# Patient Record
Sex: FEMALE | URBAN - METROPOLITAN AREA
[De-identification: names, ages, dates, MRNs, and addresses within clinical notes are randomized per-mention and may not be internally consistent; named-entity substitution may affect disease eponyms.]

---

## 2017-08-02 ENCOUNTER — IMPORTED ENCOUNTER (OUTPATIENT)
Dept: URBAN - METROPOLITAN AREA CLINIC 75 | Facility: CLINIC | Age: 57
End: 2017-08-02

## 2017-08-02 PROCEDURE — 67031 LASER SURGERY EYE STRANDS: CPT

## 2017-08-09 ENCOUNTER — IMPORTED ENCOUNTER (OUTPATIENT)
Dept: URBAN - METROPOLITAN AREA CLINIC 75 | Facility: CLINIC | Age: 57
End: 2017-08-09

## 2017-10-10 ENCOUNTER — IMPORTED ENCOUNTER (OUTPATIENT)
Dept: URBAN - METROPOLITAN AREA CLINIC 75 | Facility: CLINIC | Age: 57
End: 2017-10-10

## 2017-10-10 PROCEDURE — 92012 INTRM OPH EXAM EST PATIENT: CPT

## 2017-11-28 ENCOUNTER — IMPORTED ENCOUNTER (OUTPATIENT)
Dept: URBAN - METROPOLITAN AREA CLINIC 75 | Facility: CLINIC | Age: 57
End: 2017-11-28

## 2017-11-28 PROCEDURE — 92310 CONTACT LENS FITTING OU: CPT

## 2017-11-28 PROCEDURE — 92012 INTRM OPH EXAM EST PATIENT: CPT

## 2018-04-04 ENCOUNTER — HOSPITAL ENCOUNTER (OUTPATIENT)
Dept: MAMMOGRAPHY | Age: 58
Discharge: HOME | End: 2018-04-04
Attending: OBSTETRICS & GYNECOLOGY
Payer: COMMERCIAL

## 2018-04-04 DIAGNOSIS — Z12.39 BREAST SCREENING: ICD-10-CM

## 2018-04-04 PROCEDURE — 77067 SCR MAMMO BI INCL CAD: CPT

## 2018-04-10 ENCOUNTER — HOSPITAL ENCOUNTER (OUTPATIENT)
Dept: MAMMOGRAPHY | Age: 58
Discharge: HOME | End: 2018-04-10
Attending: INTERNAL MEDICINE
Payer: COMMERCIAL

## 2018-04-10 DIAGNOSIS — R92.8 ABNORMAL MAMMOGRAM: ICD-10-CM

## 2018-04-10 PROCEDURE — 76642 ULTRASOUND BREAST LIMITED: CPT

## 2018-04-10 PROCEDURE — 77065 DX MAMMO INCL CAD UNI: CPT

## 2018-05-30 ENCOUNTER — IMPORTED ENCOUNTER (OUTPATIENT)
Dept: URBAN - METROPOLITAN AREA CLINIC 75 | Facility: CLINIC | Age: 58
End: 2018-05-30

## 2018-05-30 PROCEDURE — 92014 COMPRE OPH EXAM EST PT 1/>: CPT

## 2018-05-30 PROCEDURE — 92025 CPTRIZED CORNEAL TOPOGRAPHY: CPT

## 2018-12-26 ENCOUNTER — HOSPITAL ENCOUNTER (OUTPATIENT)
Dept: MAMMOGRAPHY | Age: 58
Discharge: HOME | End: 2018-12-26
Attending: OBSTETRICS & GYNECOLOGY
Payer: COMMERCIAL

## 2018-12-26 DIAGNOSIS — R92.8 ABNORMAL MAMMOGRAM: ICD-10-CM

## 2018-12-26 PROCEDURE — 77065 DX MAMMO INCL CAD UNI: CPT

## 2019-03-27 ENCOUNTER — IMPORTED ENCOUNTER (OUTPATIENT)
Dept: URBAN - METROPOLITAN AREA CLINIC 75 | Facility: CLINIC | Age: 59
End: 2019-03-27

## 2019-03-27 PROCEDURE — 92015 DETERMINE REFRACTIVE STATE: CPT

## 2019-03-27 PROCEDURE — 92014 COMPRE OPH EXAM EST PT 1/>: CPT

## 2019-11-05 ENCOUNTER — IMPORTED ENCOUNTER (OUTPATIENT)
Dept: URBAN - METROPOLITAN AREA CLINIC 75 | Facility: CLINIC | Age: 59
End: 2019-11-05

## 2019-11-05 PROCEDURE — 92014 COMPRE OPH EXAM EST PT 1/>: CPT

## 2019-12-11 ENCOUNTER — IMPORTED ENCOUNTER (OUTPATIENT)
Dept: URBAN - METROPOLITAN AREA CLINIC 75 | Facility: CLINIC | Age: 59
End: 2019-12-11

## 2019-12-11 PROCEDURE — 92012 INTRM OPH EXAM EST PATIENT: CPT

## 2020-01-22 ENCOUNTER — IMPORTED ENCOUNTER (OUTPATIENT)
Dept: URBAN - METROPOLITAN AREA CLINIC 75 | Facility: CLINIC | Age: 60
End: 2020-01-22

## 2020-09-07 ENCOUNTER — IMPORTED ENCOUNTER (OUTPATIENT)
Dept: URBAN - METROPOLITAN AREA CLINIC 75 | Facility: CLINIC | Age: 60
End: 2020-09-07

## 2020-09-07 PROCEDURE — 92014 COMPRE OPH EXAM EST PT 1/>: CPT

## 2021-03-24 ENCOUNTER — TRANSCRIBE ORDER (OUTPATIENT)
Dept: SCHEDULING | Age: 61
End: 2021-03-24

## 2021-03-24 DIAGNOSIS — Z12.31 VISIT FOR SCREENING MAMMOGRAM: Primary | ICD-10-CM

## 2021-04-07 ENCOUNTER — IMPORTED ENCOUNTER (OUTPATIENT)
Dept: URBAN - METROPOLITAN AREA CLINIC 75 | Facility: CLINIC | Age: 61
End: 2021-04-07

## 2021-04-07 PROCEDURE — 92015 DETERMINE REFRACTIVE STATE: CPT

## 2021-04-07 PROCEDURE — 92014 COMPRE OPH EXAM EST PT 1/>: CPT

## 2021-05-16 ENCOUNTER — APPOINTMENT (OUTPATIENT)
Dept: CT IMAGING | Age: 61
End: 2021-05-16
Attending: EMERGENCY MEDICINE
Payer: COMMERCIAL

## 2021-05-16 ENCOUNTER — HOSPITAL ENCOUNTER (EMERGENCY)
Age: 61
Discharge: HOME | End: 2021-05-16
Attending: EMERGENCY MEDICINE
Payer: COMMERCIAL

## 2021-05-16 VITALS
OXYGEN SATURATION: 97 % | HEIGHT: 67 IN | HEART RATE: 82 BPM | SYSTOLIC BLOOD PRESSURE: 120 MMHG | RESPIRATION RATE: 18 BRPM | TEMPERATURE: 98.2 F | DIASTOLIC BLOOD PRESSURE: 83 MMHG

## 2021-05-16 DIAGNOSIS — F32.A DEPRESSION, UNSPECIFIED DEPRESSION TYPE: ICD-10-CM

## 2021-05-16 DIAGNOSIS — G44.209 ACUTE NON INTRACTABLE TENSION-TYPE HEADACHE: Primary | ICD-10-CM

## 2021-05-16 LAB
ALBUMIN SERPL-MCNC: 3.4 G/DL (ref 3.5–5)
ALBUMIN/GLOB SERPL: 0.9 {RATIO} (ref 1.1–2.2)
ALP SERPL-CCNC: 58 U/L (ref 45–117)
ALT SERPL-CCNC: 17 U/L (ref 12–78)
ANION GAP SERPL CALC-SCNC: 3 MMOL/L (ref 5–15)
AST SERPL-CCNC: 18 U/L (ref 15–37)
BASOPHILS # BLD: 0.1 K/UL (ref 0–0.1)
BASOPHILS NFR BLD: 1 % (ref 0–1)
BILIRUB SERPL-MCNC: 0.4 MG/DL (ref 0.2–1)
BUN SERPL-MCNC: 8 MG/DL (ref 6–20)
BUN/CREAT SERPL: 10 (ref 12–20)
CALCIUM SERPL-MCNC: 8.9 MG/DL (ref 8.5–10.1)
CHLORIDE SERPL-SCNC: 105 MMOL/L (ref 97–108)
CO2 SERPL-SCNC: 28 MMOL/L (ref 21–32)
CREAT SERPL-MCNC: 0.79 MG/DL (ref 0.55–1.02)
DIFFERENTIAL METHOD BLD: ABNORMAL
EOSINOPHIL # BLD: 0.1 K/UL (ref 0–0.4)
EOSINOPHIL NFR BLD: 1 % (ref 0–7)
ERYTHROCYTE [DISTWIDTH] IN BLOOD BY AUTOMATED COUNT: 12.8 % (ref 11.5–14.5)
GLOBULIN SER CALC-MCNC: 3.9 G/DL (ref 2–4)
GLUCOSE SERPL-MCNC: 81 MG/DL (ref 65–100)
HCT VFR BLD AUTO: 38.7 % (ref 35–47)
HGB BLD-MCNC: 12.9 G/DL (ref 11.5–16)
IMM GRANULOCYTES # BLD AUTO: 0 K/UL (ref 0–0.04)
IMM GRANULOCYTES NFR BLD AUTO: 0 % (ref 0–0.5)
LYMPHOCYTES # BLD: 2.5 K/UL (ref 0.8–3.5)
LYMPHOCYTES NFR BLD: 26 % (ref 12–49)
MCH RBC QN AUTO: 34.3 PG (ref 26–34)
MCHC RBC AUTO-ENTMCNC: 33.3 G/DL (ref 30–36.5)
MCV RBC AUTO: 102.9 FL (ref 80–99)
MONOCYTES # BLD: 0.8 K/UL (ref 0–1)
MONOCYTES NFR BLD: 8 % (ref 5–13)
NEUTS SEG # BLD: 6.1 K/UL (ref 1.8–8)
NEUTS SEG NFR BLD: 64 % (ref 32–75)
NRBC # BLD: 0 K/UL (ref 0–0.01)
NRBC BLD-RTO: 0 PER 100 WBC
PLATELET # BLD AUTO: 292 K/UL (ref 150–400)
PMV BLD AUTO: 8.9 FL (ref 8.9–12.9)
POTASSIUM SERPL-SCNC: 3.8 MMOL/L (ref 3.5–5.1)
PROT SERPL-MCNC: 7.3 G/DL (ref 6.4–8.2)
RBC # BLD AUTO: 3.76 M/UL (ref 3.8–5.2)
SODIUM SERPL-SCNC: 136 MMOL/L (ref 136–145)
WBC # BLD AUTO: 9.6 K/UL (ref 3.6–11)

## 2021-05-16 PROCEDURE — 85025 COMPLETE CBC W/AUTO DIFF WBC: CPT

## 2021-05-16 PROCEDURE — 70450 CT HEAD/BRAIN W/O DYE: CPT

## 2021-05-16 PROCEDURE — 36415 COLL VENOUS BLD VENIPUNCTURE: CPT

## 2021-05-16 PROCEDURE — 80053 COMPREHEN METABOLIC PANEL: CPT

## 2021-05-16 PROCEDURE — 99283 EMERGENCY DEPT VISIT LOW MDM: CPT

## 2021-05-16 NOTE — ED NOTES
Patient reports ambulatory to ED with c/o of feeling lethargic and pressure around head and overall feeling of \"sick\" she states. Patient denies falling or hitting head. States she has a hx of aneurysms in the family, states she also has a hx of sleep apnea and   Depression and just started taking wellbutrin.

## 2021-05-16 NOTE — LETTER
NOTIFICATION RETURN TO WORK / SCHOOL    5/16/2021 11:17 PM    Ms. [address] [address]  [facility]      To Whom It May Concern:    Dakota Arits is currently under the care of [facility] EMERGENCY DEPT. She will return to work/school on: Tuesday 05/18/2021    Dakota Artis may return to work/school with no restrictions. If there are questions or concerns please have the patient contact our emergency department. Sincerely,          RAJWINDER GALLAGHER [facility] MD

## 2021-05-17 NOTE — ED NOTES
2325 - Patient discharge by Rosalia Strickland MD - pt sent to the front lobby, with strong and steady gait -  Discharge information / home RX / and reasons to return to the ED were reviewed by the doctor.

## 2021-05-17 NOTE — ED PROVIDER NOTES
EMERGENCY DEPARTMENT HISTORY AND PHYSICAL EXAM      Date: 5/16/2021  Patient Name: Susan Ding    History of Presenting Illness     Chief Complaint   Patient presents with    Lethargy     x 1 day  Pt reports she felt so bad she called the FD to see if there was an CO 2 in her house. Pt reports eria denies vomiting    Headache     top of her head x 1 dayand radiates around her face       History Provided By: Patient    HPI: Susan Ding, 56 y.o. female  With past medical history of hypertension presenting today with generalized malaise. The patient says that she has been feeling poorly for the past several days. She actually was seen at [facility] this morning and had laboratory studies done. She was told that she was dehydrated so she went home and drank a lot of Gatorade and water. She says she has been having an abnormal sensation that feels like a pressure sensation across the top of her head. She typically does not have headaches. She does have family history of aneurysm. No nausea, vomiting, or changes in bowel habits. The patient does note that she is been going through a lot of emotional issues recently due to a divorce, her previous has been moved out, and she now lives alone with no kids in the house, she feels significant stress because of that. She says that she has been feeling very emotional and having a lot of crying recently. She says that she was prescribed Wellbutrin by her primary doctor but has not really been taking it after trying a couple of doses. She plans to reconnect with her primary doctor and possibly pursue antidepressant versus psychotherapy. There are no other complaints, changes, or physical findings at this time. PCP: Malik Landa MD    No current facility-administered medications on file prior to encounter.       Current Outpatient Medications on File Prior to Encounter   Medication Sig Dispense Refill    conjugated estrogens (PREMARIN) 0.9 mg tablet Take 1 Tab by mouth daily. Overdue for appt. 30 Tab 0    hydrochlorothiazide (HYDRODIURIL) 25 mg tablet Take 1 Tab by mouth daily. Overdue for appt. 30 Tab 1    calcium-vitamin D (CALCIUM 500+D) 500 mg(1,250mg) -200 unit per tablet Take 1 Tab by mouth two (2) times daily (with meals).  cholecalciferol, vitamin d3, (VITAMIN D) 1,000 unit tablet Take  by mouth daily.  diphenhydrAMINE (SIMPLY SLEEP) 25 mg tablet Take 25 mg by mouth every six (6) hours as needed.  mvi adult with vit k (M.V.I. ADULT) 1-5- mg-mcg-mg-mg injection 10 mL by IntraVENous route daily.  aspirin 81 mg chewable tablet Take 81 mg by mouth daily. 2 po qd          Past History     Past Medical History:  No past medical history on file. Past Surgical History:  Past Surgical History:   Procedure Laterality Date     DELIVERY ONLY      , Low Cervical     DELIVERY ONLY      , Low Cervical    COLONOSCOPY      DEXA BONE DENSITY STUDY AXIAL      HC CHOLECYSTECTOMY FNC41      ANNEL MAMMOGRAM SCREEN BILAT      THYROIDECTOMY      partial  nodule    TOTAL ABDOM HYSTERECTOMY      Hysterectomy, Total Abdominal       Family History:  Family History   Problem Relation Age of Onset    Hypertension Mother     Hypertension Sister     Hypertension Brother     Hypertension Paternal Grandmother     Breast Cancer Maternal Aunt 28       Social History:  Social History     Tobacco Use    Smoking status: Never Smoker   Substance Use Topics    Alcohol use: Yes     Comment: occasional    Drug use: Not on file       Allergies:   Allergies   Allergen Reactions    Codeine Unknown (comments)    Erythromycin Unknown (comments)    Shrimp Hives    Sulfa (Sulfonamide Antibiotics) Unknown (comments)         Review of Systems   Constitutional: No  fever  Skin: No  rash  HEENT: No  nasal congestion  Resp: No cough  CV: No chest pain  GI: No vomiting  : No dysuria  MSK: No joint pain  Neuro: No numbness  Psych: No anxiety      Physical Exam     Patient Vitals for the past 12 hrs:   Temp Pulse Resp BP SpO2   05/16/21 2241  82 18 120/83 97 %   05/16/21 2004     98 %   05/16/21 2000  87 18 113/85 98 %   05/16/21 1933 98.2 °F (36.8 °C) 90 16 116/79 100 %       General: alert, No acute distress  Eyes: EOMI, normal conjunctiva  ENT: moist mucous membranes. Neck: Active, full ROM of neck. Skin: No rashes. no jaundice              Lungs: Equal chest expansion. no respiratory distress. Heart: regular rate     no peripheral edema    Abd:  non distended soft  Back: Full ROM  MSK: Full, active ROM in all 4 extremities. Neuro: alert  Person, Place, Time and Situation; normal speech;   Psych: Cooperative with exam; Appropriate mood and affect             Diagnostic Study Results     Labs -     Recent Results (from the past 12 hour(s))   CBC WITH AUTOMATED DIFF    Collection Time: 05/16/21  7:57 PM   Result Value Ref Range    WBC 9.6 3.6 - 11.0 K/uL    RBC 3.76 (L) 3.80 - 5.20 M/uL    HGB 12.9 11.5 - 16.0 g/dL    HCT 38.7 35.0 - 47.0 %    .9 (H) 80.0 - 99.0 FL    MCH 34.3 (H) 26.0 - 34.0 PG    MCHC 33.3 30.0 - 36.5 g/dL    RDW 12.8 11.5 - 14.5 %    PLATELET 452 696 - 525 K/uL    MPV 8.9 8.9 - 12.9 FL    NRBC 0.0 0  WBC    ABSOLUTE NRBC 0.00 0.00 - 0.01 K/uL    NEUTROPHILS 64 32 - 75 %    LYMPHOCYTES 26 12 - 49 %    MONOCYTES 8 5 - 13 %    EOSINOPHILS 1 0 - 7 %    BASOPHILS 1 0 - 1 %    IMMATURE GRANULOCYTES 0 0.0 - 0.5 %    ABS. NEUTROPHILS 6.1 1.8 - 8.0 K/UL    ABS. LYMPHOCYTES 2.5 0.8 - 3.5 K/UL    ABS. MONOCYTES 0.8 0.0 - 1.0 K/UL    ABS. EOSINOPHILS 0.1 0.0 - 0.4 K/UL    ABS. BASOPHILS 0.1 0.0 - 0.1 K/UL    ABS. IMM.  GRANS. 0.0 0.00 - 0.04 K/UL    DF AUTOMATED     METABOLIC PANEL, COMPREHENSIVE    Collection Time: 05/16/21  7:57 PM   Result Value Ref Range    Sodium 136 136 - 145 mmol/L    Potassium 3.8 3.5 - 5.1 mmol/L    Chloride 105 97 - 108 mmol/L    CO2 28 21 - 32 mmol/L Anion gap 3 (L) 5 - 15 mmol/L    Glucose 81 65 - 100 mg/dL    BUN 8 6 - 20 MG/DL    Creatinine 0.79 0.55 - 1.02 MG/DL    BUN/Creatinine ratio 10 (L) 12 - 20      GFR est AA >60 >60 ml/min/1.73m2    GFR est non-AA >60 >60 ml/min/1.73m2    Calcium 8.9 8.5 - 10.1 MG/DL    Bilirubin, total 0.4 0.2 - 1.0 MG/DL    ALT (SGPT) 17 12 - 78 U/L    AST (SGOT) 18 15 - 37 U/L    Alk. phosphatase 58 45 - 117 U/L    Protein, total 7.3 6.4 - 8.2 g/dL    Albumin 3.4 (L) 3.5 - 5.0 g/dL    Globulin 3.9 2.0 - 4.0 g/dL    A-G Ratio 0.9 (L) 1.1 - 2.2         Radiologic Studies -   CT HEAD WO CONT   Final Result   No acute intracranial abnormality. CT Results  (Last 48 hours)               05/16/21 2218  CT HEAD WO CONT Final result    Impression:  No acute intracranial abnormality. Narrative:  EXAM: CT HEAD WO CONT       INDICATION: Headache       COMPARISON: None. CONTRAST: None. TECHNIQUE: Unenhanced CT of the head was performed using 5 mm images. Brain and   bone windows were generated. Coronal and sagittal reformats. CT dose reduction   was achieved through use of a standardized protocol tailored for this   examination and automatic exposure control for dose modulation. FINDINGS:   The ventricles and sulci are normal in size, shape and configuration. . There is   no significant white matter disease. There is no intracranial hemorrhage,   extra-axial collection, or mass effect. The basilar cisterns are open. No CT   evidence of acute infarct. The bone windows demonstrate no abnormalities. The visualized portions of the   paranasal sinuses and mastoid air cells are clear. CXR Results  (Last 48 hours)    None          Medical Decision Making   I am the first provider for this patient. I reviewed the vital signs, available nursing notes, past medical history, past surgical history, family history and social history.       Vital Signs-Reviewed the patient's vital signs. Patient Vitals for the past 12 hrs:   Temp Pulse Resp BP SpO2   05/16/21 2241  82 18 120/83 97 %   05/16/21 2004     98 %   05/16/21 2000  87 18 113/85 98 %   05/16/21 1933 98.2 °F (36.8 °C) 90 16 116/79 100 %       Provider Notes (Medical Decision Making):     Differential Diagnosis: Migraine headache, tension headache, electrolyte abnormality, dehydration, depression, anxiety    Initial Plan: Will obtain CT head, basic laboratory studies. The patient I discussed her depressive symptoms and how this could be contributing to her headaches. She plans on seeing her primary doctor regarding the depression and anxiety symptoms she has been experiencing. Will reassess after work-up. ED Course:   Initial assessment performed. The patients presenting problems have been discussed, and they are in agreement with the care plan formulated and outlined with them. I have encouraged them to ask questions as they arise throughout their visit. ED Course as of May 16 2311   Sun May 16, 2021   2211 On my interpretation of the patient's laboratory studies unremarkable metabolic panel, CBC without elevation white blood cell count, normal hemoglobin. [NW]   4995 On my interpretation of patient's CT had no evidence of acute abnormality. [NW]   1674 Patient presenting today with headache symptoms, unremarkable laboratory work-up, CT head without any acute abnormality. Ultimately discharged follow-up with her primary care provider for her symptoms of depression.    [NW]      ED Course User Index  [NW] Fritz Ibarra MD       I, Deborah Gill MD, am the attending of record for this patient encounter. Dispo: Discharged. The patient has been re-evaluated and is ready for discharge. Reviewed available results with patient. Counseled patient on diagnosis and care plan. Patient has expressed understanding, and all questions have been answered.  Patient agrees with plan and agrees to follow up as recommended, or to return to the ED if their symptoms worsen. Discharge instructions have been provided and explained to the patient, along with reasons to return to the ED. PLAN:  Current Discharge Medication List        2. Follow-up Information     Follow up With Specialties Details Why Neftali Engel, [address] [facility]   [address]  Critical access hospital  [phone]          3. Return to ED if worse       Diagnosis     Clinical Impression:   1. Acute non intractable tension-type headache    2. Depression, unspecified depression type        Attestations:    Steven Galvez MD    Please note that this dictation was completed with [employer], the computer voice recognition software. Quite often unanticipated grammatical, syntax, homophones, and other interpretive errors are inadvertently transcribed by the computer software. Please disregard these errors. Please excuse any errors that have escaped final proofreading. Thank you.

## 2021-07-23 ENCOUNTER — HOSPITAL ENCOUNTER (OUTPATIENT)
Dept: MAMMOGRAPHY | Age: 61
Discharge: HOME | End: 2021-07-23
Attending: INTERNAL MEDICINE
Payer: COMMERCIAL

## 2021-07-23 DIAGNOSIS — Z12.31 VISIT FOR SCREENING MAMMOGRAM: ICD-10-CM

## 2021-07-23 PROCEDURE — 77063 BREAST TOMOSYNTHESIS BI: CPT

## 2021-07-26 ENCOUNTER — OFFICE VISIT (OUTPATIENT)
Dept: SURGERY | Age: 61
End: 2021-07-26
Payer: COMMERCIAL

## 2021-07-26 VITALS
BODY MASS INDEX: 22.13 KG/M2 | RESPIRATION RATE: 16 BRPM | HEART RATE: 77 BPM | TEMPERATURE: 98.1 F | OXYGEN SATURATION: 99 % | HEIGHT: 67 IN | WEIGHT: 141 LBS | DIASTOLIC BLOOD PRESSURE: 76 MMHG | SYSTOLIC BLOOD PRESSURE: 109 MMHG

## 2021-07-26 DIAGNOSIS — R10.31 RIGHT GROIN PAIN: Primary | ICD-10-CM

## 2021-07-26 PROCEDURE — 99202 OFFICE O/P NEW SF 15 MIN: CPT | Performed by: SURGERY

## 2021-07-26 NOTE — PROGRESS NOTES
1. Have you been to the ER, urgent care clinic since your last visit? Hospitalized since your last visit? No    2. Have you seen or consulted any other health care providers outside of the [address] [place] [address] since your last visit? Include any pap smears or colon screening.  No

## 2021-08-19 ENCOUNTER — HOSPITAL ENCOUNTER (OUTPATIENT)
Dept: ULTRASOUND IMAGING | Age: 61
Discharge: HOME | End: 2021-08-19
Attending: SURGERY
Payer: COMMERCIAL

## 2021-08-19 DIAGNOSIS — R10.31 RIGHT GROIN PAIN: ICD-10-CM

## 2021-08-19 PROCEDURE — 76705 ECHO EXAM OF ABDOMEN: CPT

## 2022-03-19 PROBLEM — R10.31 RIGHT GROIN PAIN: Status: ACTIVE | Noted: 2021-07-26

## 2022-04-12 ENCOUNTER — IMPORTED ENCOUNTER (OUTPATIENT)
Dept: URBAN - METROPOLITAN AREA CLINIC 75 | Facility: CLINIC | Age: 62
End: 2022-04-12

## 2022-04-12 PROCEDURE — 92014 COMPRE OPH EXAM EST PT 1/>: CPT

## 2022-04-15 ENCOUNTER — OFFICE VISIT (OUTPATIENT)
Dept: NEUROLOGY | Age: 62
End: 2022-04-15

## 2022-07-15 ENCOUNTER — TELEPHONE (OUTPATIENT)
Dept: SURGERY | Age: 62
End: 2022-07-15

## 2022-07-15 ENCOUNTER — TRANSCRIBE ORDER (OUTPATIENT)
Dept: SCHEDULING | Age: 62
End: 2022-07-15

## 2022-07-15 DIAGNOSIS — Z12.31 SCREENING MAMMOGRAM FOR HIGH-RISK PATIENT: Primary | ICD-10-CM

## 2022-07-15 NOTE — TELEPHONE ENCOUNTER
I spoke with the patient earlier. I informed her the doctor wanted to see you after the US was completed. I recommended she come in to be seen since she was still having pain and the results of the US can be discussed at that time. She is in agreement but would be prefer to see someone else this time. I mentioned Dr. Alissa Rosa and told her I will have the  call you to set up an appointment. She acknowledged understanding and thanked me for the call.

## 2022-07-15 NOTE — TELEPHONE ENCOUNTER
Patient called wanting to discuss her CT scan results from 8/19/21. Stated she Is still experiencing pain and would like to know plan of care.

## 2022-07-29 ENCOUNTER — HOSPITAL ENCOUNTER (OUTPATIENT)
Dept: MAMMOGRAPHY | Age: 62
Discharge: HOME | End: 2022-07-29
Attending: INTERNAL MEDICINE
Payer: COMMERCIAL

## 2022-07-29 DIAGNOSIS — Z12.31 SCREENING MAMMOGRAM FOR HIGH-RISK PATIENT: ICD-10-CM

## 2022-07-29 PROCEDURE — 77063 BREAST TOMOSYNTHESIS BI: CPT

## 2022-08-08 ENCOUNTER — TRANSCRIBE ORDER (OUTPATIENT)
Dept: SCHEDULING | Age: 62
End: 2022-08-08

## 2022-08-08 DIAGNOSIS — R22.1 NECK MASS: Primary | ICD-10-CM

## 2022-08-12 ENCOUNTER — HOSPITAL ENCOUNTER (OUTPATIENT)
Dept: ULTRASOUND IMAGING | Age: 62
Discharge: HOME | End: 2022-08-12
Attending: INTERNAL MEDICINE
Payer: COMMERCIAL

## 2022-08-12 DIAGNOSIS — R22.1 NECK MASS: ICD-10-CM

## 2022-08-12 PROCEDURE — 76536 US EXAM OF HEAD AND NECK: CPT

## 2022-10-13 ENCOUNTER — OFFICE VISIT (OUTPATIENT)
Dept: NEUROLOGY | Age: 62
End: 2022-10-13
Payer: COMMERCIAL

## 2022-10-13 VITALS
SYSTOLIC BLOOD PRESSURE: 130 MMHG | HEIGHT: 67 IN | HEART RATE: 70 BPM | WEIGHT: 135 LBS | RESPIRATION RATE: 20 BRPM | DIASTOLIC BLOOD PRESSURE: 82 MMHG | BODY MASS INDEX: 21.19 KG/M2 | OXYGEN SATURATION: 96 %

## 2022-10-13 DIAGNOSIS — G89.29 CHRONIC NONINTRACTABLE HEADACHE, UNSPECIFIED HEADACHE TYPE: Primary | ICD-10-CM

## 2022-10-13 DIAGNOSIS — R41.3 MEMORY CHANGES: ICD-10-CM

## 2022-10-13 DIAGNOSIS — R51.9 CHRONIC NONINTRACTABLE HEADACHE, UNSPECIFIED HEADACHE TYPE: Primary | ICD-10-CM

## 2022-10-13 PROCEDURE — 99204 OFFICE O/P NEW MOD 45 MIN: CPT

## 2022-10-13 RX ORDER — TURMERIC ROOT EXTRACT 500 MG
TABLET ORAL
COMMUNITY

## 2022-10-13 RX ORDER — BUPROPION HYDROCHLORIDE 100 MG/1
TABLET ORAL
COMMUNITY
Start: 2022-08-08

## 2022-10-13 RX ORDER — DIAZEPAM 5 MG/1
TABLET ORAL
Qty: 1 TABLET | Refills: 0 | Status: SHIPPED | OUTPATIENT
Start: 2022-10-13

## 2022-10-13 RX ORDER — ASPIRIN 325 MG
TABLET, DELAYED RELEASE (ENTERIC COATED) ORAL
COMMUNITY

## 2022-10-13 NOTE — PROGRESS NOTES
Headache on the top of her head, couple of years now  No injuries   Comes and goes, but always reappears in the same spot   No nausea/vomitting with headache   Blurred vision yes on occasion   No referral today   She was asking about a test to find out if she was more at risk to get alzheimers disease  Tylenol or advil might be her go to medication but she would try some water and maybe something to eat first

## 2022-10-13 NOTE — PROGRESS NOTES
Ceci Fletcher is a 57 y.o. female who presents with the following  Chief Complaint   Patient presents with    New Patient     Headache on the top of her head        HPI  Ms. Nan Lombardi is a 58-year-old female with PMHx of HTN, anxiety and depression presenting to our clinic for headaches. She has a family hx of intracranial aneurysms on her father side. Patient was seen by [facility] neurology in September 2020 Dr. Gisel Morales for headaches occurring on the top of her head. Physician ordered an MRI of the brain which was normal, as well as a sleep study which showed moderate obstructive sleep apnea. It was suggested that the patient wear a CPAP, but the patient does not wear it because it is uncomfortable. The patient had a follow-up visit with Dr. Sharon Jules in May 2021 where she stated that her headaches had resolved, however she was experiencing \"internal tremors\" and had recently been started on Wellbutrin by her PCP for anxiety. The suggested plan was that the patient was to try relaxation techniques, exercise, and to stay hydrated. Today the patient comes to our office stating that she is still experiencing the same headache on the top of her head. The headaches feel like pressure. Patient states that she has had these for a couple of years now and they come and go. She has them about 2 times a week. She states that they are all severe in nature and a 7 out of 10 on the pain scale. She states these headaches come mostly at night and she has no idea what the triggers are. She states that they can radiate down the right side of her head. They last all night however, they do not keep her from sleeping. When she awakes the next morning she states that the headache is usually still there but it dissipates as she starts her day. Denies photophobia ,phonophobia, or nausea vomiting. Patient states that sometimes she will try Advil or Tylenol to try to ease the pain however this does not always help.   If she realizes she has not drank enough water or is hungry she will drink water and it will help the headache go away. She has never taken anything prescription for headaches. 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 Patient requesting MRI of the brain today to rule out aneurysm. Patient is anxious about aneurysms feeling that she must have one. Patient states that she is anxious for MRIs, I told her I would order Valium prior to her test however she must have someone drive her and take her home from the MRI. Patient stated that she understood    Patient also with complaints that she has had memory issues for the last couple of years. She is having trouble remembering recent events, names of people and places, and word finding at times. Patient is worried she has the beginning onset of Alzheimer's. Is requesting testing for memory. Allergies   Allergen Reactions    Codeine Unknown (comments)    Erythromycin Unknown (comments)    Shrimp Hives    Sulfa (Sulfonamide Antibiotics) Unknown (comments)       Current Outpatient Medications   Medication Sig    buPROPion (WELLBUTRIN) 100 mg tablet     omega 3-dha-epa-fish oil 60- mg cap 1 cap(s)    POTASSIUM-99 PO potassium    L.acid,casei,plant,saliv/B.ani (PROBIOTIC FORMULA PO) PO, daily, 0 Refills    vitamin E acetate (VITAMIN E PO) vitamin E    turmeric root extract 500 mg tab 1 cap(s)    cholecalciferol, vitamin D3, (VITAMIN D3 PO) as directed. OTHER Mathyloufony/methane combination    pyridoxine HCl, vitamin B6, (VITAMIN B-6 PO) Take  by mouth. diazePAM (VALIUM) 5 mg tablet Take 45 minutes prior to MRI for anxiety  Indications: anxious    hydrochlorothiazide (HYDRODIURIL) 25 mg tablet Take 1 Tab by mouth daily. Overdue for appt. cholecalciferol (VITAMIN D3) (1000 Units /25 mcg) tablet Take  by mouth daily. mvi Adult with vit k (INFUVITE) 3,300 unit- 150 mcg/10 mL injection 10 mL by IntraVENous route daily. No current facility-administered medications for this visit. Social History     Tobacco Use   Smoking Status Never   Smokeless Tobacco Never       Past Medical History:   Diagnosis Date    Right groin pain 2021       Past Surgical History:   Procedure Laterality Date    COLONOSCOPY      DEXA BONE DENSITY STUDY AXIAL      HC CHOLECYSTECTOMY FNC41      ANNEL MAMMOGRAM SCREEN BILAT      TN  DELIVERY ONLY      , Low Cervical    TN  DELIVERY ONLY      , Low Cervical    TN THYROIDECTOMY      partial  nodule    TN TOTAL ABDOM HYSTERECTOMY      Hysterectomy, Total Abdominal       Family History   Problem Relation Age of Onset    Hypertension Mother     Hypertension Sister     Hypertension Brother     Hypertension Paternal Grandmother     Breast Cancer Maternal Aunt 28       Social History     Socioeconomic History    Marital status:     Number of children: 2   Occupational History    Occupation: teacher   Tobacco Use    Smoking status: Never    Smokeless tobacco: Never   Substance and Sexual Activity    Alcohol use: Yes     Comment: occasional    Sexual activity: Never       ROS    Remainder of comprehensive review is negative. Physical Exam :    Visit Vitals  /82 (BP 1 Location: Left upper arm, BP Patient Position: Sitting, BP Cuff Size: Adult)   Pulse 70   Resp 20   Ht 5' 7\" (1.702 m)   Wt 61.2 kg (135 lb)   SpO2 96%   BMI 21.14 kg/m²       General: Well defined, nourished, and groomed individual in no acute distress. Neck: Supple, nontender, no bruits, no pain with resistance to active range of motion. Musculoskeletal: Extremities revealed no edema and had full range of motion of joints. Psych: Good mood and bright affect    NEUROLOGICAL EXAMINATION:    Mental Status: Alert and oriented to person, place, and time    Cranial Nerves:    II, III, IV, VI: Visual acuity grossly intact.  Visual fields are normal.    Pupils are equal, round, and reactive to light and accommodation. Extra-ocular movements are full and fluid. Fundoscopic exam was benign, no ptosis or nystagmus. V-XII: Hearing is grossly intact. Facial features are symmetric, with normal sensation and strength. The palate rises symmetrically and the tongue protrudes midline. Sternocleidomastoids 5/5. Motor Examination: Normal tone, bulk, and strength, 5/5 muscle strength throughout. Coordination: Finger to nose was normal. No resting or intention tremor    Gait and Station: Steady while walking. Normal arm swing. No pronator drift. No muscle wasting or fasiculations noted. Reflexes: DTRs 2+ throughout. Results for orders placed or performed during the hospital encounter of 05/16/21   CBC WITH AUTOMATED DIFF   Result Value Ref Range    WBC 9.6 3.6 - 11.0 K/uL    RBC 3.76 (L) 3.80 - 5.20 M/uL    HGB 12.9 11.5 - 16.0 g/dL    HCT 38.7 35.0 - 47.0 %    .9 (H) 80.0 - 99.0 FL    MCH 34.3 (H) 26.0 - 34.0 PG    MCHC 33.3 30.0 - 36.5 g/dL    RDW 12.8 11.5 - 14.5 %    PLATELET 226 893 - 417 K/uL    MPV 8.9 8.9 - 12.9 FL    NRBC 0.0 0  WBC    ABSOLUTE NRBC 0.00 0.00 - 0.01 K/uL    NEUTROPHILS 64 32 - 75 %    LYMPHOCYTES 26 12 - 49 %    MONOCYTES 8 5 - 13 %    EOSINOPHILS 1 0 - 7 %    BASOPHILS 1 0 - 1 %    IMMATURE GRANULOCYTES 0 0.0 - 0.5 %    ABS. NEUTROPHILS 6.1 1.8 - 8.0 K/UL    ABS. LYMPHOCYTES 2.5 0.8 - 3.5 K/UL    ABS. MONOCYTES 0.8 0.0 - 1.0 K/UL    ABS. EOSINOPHILS 0.1 0.0 - 0.4 K/UL    ABS. BASOPHILS 0.1 0.0 - 0.1 K/UL    ABS. IMM.  GRANS. 0.0 0.00 - 0.04 K/UL    DF AUTOMATED     METABOLIC PANEL, COMPREHENSIVE   Result Value Ref Range    Sodium 136 136 - 145 mmol/L    Potassium 3.8 3.5 - 5.1 mmol/L    Chloride 105 97 - 108 mmol/L    CO2 28 21 - 32 mmol/L    Anion gap 3 (L) 5 - 15 mmol/L    Glucose 81 65 - 100 mg/dL    BUN 8 6 - 20 MG/DL    Creatinine 0.79 0.55 - 1.02 MG/DL    BUN/Creatinine ratio 10 (L) 12 - 20      GFR est AA >60 >60 ml/min/1.73m2    GFR est non-AA >60 >60 ml/min/1.73m2    Calcium 8.9 8.5 - 10.1 MG/DL    Bilirubin, total 0.4 0.2 - 1.0 MG/DL    ALT (SGPT) 17 12 - 78 U/L    AST (SGOT) 18 15 - 37 U/L    Alk. phosphatase 58 45 - 117 U/L    Protein, total 7.3 6.4 - 8.2 g/dL    Albumin 3.4 (L) 3.5 - 5.0 g/dL    Globulin 3.9 2.0 - 4.0 g/dL    A-G Ratio 0.9 (L) 1.1 - 2.2         Orders Placed This Encounter    MRI BRAIN WO CONT     Standing Status:   Future     Standing Expiration Date:   2023    REFERRAL TO NEUROPSYCHOLOGY     Referral Priority:   Routine     Referral Type:   Consultation     Referral Reason:   Specialty Services Required     Number of Visits Requested:   1    buPROPion (WELLBUTRIN) 100 mg tablet    omega 3-dha-epa-fish oil 60- mg cap     Si cap(s)    POTASSIUM-99 PO     Sig: potassium    L.acid,casei,plant,saliv/B.ani (PROBIOTIC FORMULA PO)     Sig: PO, daily, 0 Refills    vitamin E acetate (VITAMIN E PO)     Sig: vitamin E    turmeric root extract 500 mg tab     Si cap(s)    cholecalciferol, vitamin D3, (VITAMIN D3 PO)     Sig: as directed. OTHER     Sig: Mathyloufony/methane combination    pyridoxine HCl, vitamin B6, (VITAMIN B-6 PO)     Sig: Take  by mouth. diazePAM (VALIUM) 5 mg tablet     Sig: Take 45 minutes prior to MRI for anxiety  Indications: anxious     Dispense:  1 Tablet     Refill:  0       1. Chronic nonintractable headache, unspecified headache type    2.  Memory changes      MRI of the brain without contrast (due to shellfish allergy)    Referral for neuropsych testing per patient's request to assess memory issues    Return after MRI is complete              This note will not be viewable in AppTapt

## 2022-10-25 ENCOUNTER — HOSPITAL ENCOUNTER (OUTPATIENT)
Dept: MRI IMAGING | Age: 62
Discharge: HOME | End: 2022-10-25
Payer: COMMERCIAL

## 2022-10-25 DIAGNOSIS — R51.9 CHRONIC NONINTRACTABLE HEADACHE, UNSPECIFIED HEADACHE TYPE: ICD-10-CM

## 2022-10-25 DIAGNOSIS — R41.3 MEMORY CHANGES: ICD-10-CM

## 2022-10-25 DIAGNOSIS — G89.29 CHRONIC NONINTRACTABLE HEADACHE, UNSPECIFIED HEADACHE TYPE: ICD-10-CM

## 2022-10-25 PROCEDURE — 70551 MRI BRAIN STEM W/O DYE: CPT

## 2022-11-17 ENCOUNTER — TELEPHONE (OUTPATIENT)
Dept: NEUROLOGY | Age: 62
End: 2022-11-17

## 2022-11-18 NOTE — TELEPHONE ENCOUNTER
Returned her call. Explained her results to her. She has verbalized understanding. She was asking if she had to keep her appt with provider in April. Advised her to keep it, when it gets closer to appt and she didn't feel as though she needed it she could cancel if she wanted to.

## 2023-03-16 ENCOUNTER — TELEPHONE (OUTPATIENT)
Dept: NEUROLOGY | Age: 63
End: 2023-03-16

## 2023-03-24 ENCOUNTER — TRANSCRIBE ORDER (OUTPATIENT)
Dept: SCHEDULING | Age: 63
End: 2023-03-24

## 2023-03-24 DIAGNOSIS — E04.1 THYROID NODULE: Primary | ICD-10-CM

## 2023-03-24 DIAGNOSIS — R49.0 HOARSENESS: ICD-10-CM

## 2023-03-24 DIAGNOSIS — K21.9 ESOPHAGEAL REFLUX: ICD-10-CM

## 2023-04-23 DIAGNOSIS — E04.1 THYROID NODULE: Primary | ICD-10-CM

## 2023-04-23 DIAGNOSIS — R49.0 HOARSENESS: ICD-10-CM

## 2023-06-20 ENCOUNTER — COMPLETE EYE EXAM (OUTPATIENT)
Dept: URBAN - METROPOLITAN AREA CLINIC 42 | Facility: CLINIC | Age: 63
End: 2023-06-20

## 2023-06-20 DIAGNOSIS — H43.393: ICD-10-CM

## 2023-06-20 DIAGNOSIS — Z96.1: ICD-10-CM

## 2023-06-20 PROCEDURE — 92014 COMPRE OPH EXAM EST PT 1/>: CPT

## 2023-06-20 ASSESSMENT — VISUAL ACUITY
OS_SC: 20/20-2
OD_SC: 20/20

## 2023-06-20 ASSESSMENT — TONOMETRY
OS_IOP_MMHG: 13
OD_IOP_MMHG: 13

## 2023-07-05 ENCOUNTER — TRANSCRIBE ORDERS (OUTPATIENT)
Facility: HOSPITAL | Age: 63
End: 2023-07-05

## 2023-07-05 DIAGNOSIS — Z12.31 SCREENING MAMMOGRAM FOR HIGH-RISK PATIENT: Primary | ICD-10-CM

## 2023-07-26 ENCOUNTER — TELEPHONE (OUTPATIENT)
Age: 63
End: 2023-07-26

## 2023-07-26 NOTE — TELEPHONE ENCOUNTER
Patient called stating they had previously set an appt for 07/27/2023, and was calling to make sure it was cancelled. I did not see it in the system, so I informed them they had not appt for tomorrow. Patient was satisfied.

## 2023-07-31 ENCOUNTER — HOSPITAL ENCOUNTER (OUTPATIENT)
Age: 63
Discharge: HOME | End: 2023-08-03
Payer: COMMERCIAL

## 2023-07-31 VITALS — WEIGHT: 137 LBS | BODY MASS INDEX: 21.5 KG/M2 | HEIGHT: 67 IN

## 2023-07-31 DIAGNOSIS — Z12.31 SCREENING MAMMOGRAM FOR HIGH-RISK PATIENT: ICD-10-CM

## 2023-07-31 PROCEDURE — 77063 BREAST TOMOSYNTHESIS BI: CPT

## 2023-10-22 ASSESSMENT — VISUAL ACUITY
OS_CC: 20/20
OS_SC: 20/20 -2
OD_SC: 20/20 -2
OS_CC: 20/20 -1
OD_CC: 20/25
OD_CC: 20/20
OD_CC: 20/20 -1
OS_CC: 20/20
OS_CC: 20/20
OD_SC: 20/20
OD_CC: 20/20
OS_CC: 20/20
OD_SC: 20/25 BLURRY
OD_SC: 20/20
OD_SC: 20/25 BLURRY
OS_CC: 20/20
OS_CC: 20/20
OD_SC: 20/20
OS_SC: 20/20 BLURRY
OD_CC: 20/30
OD_CC: 20/20 -2

## 2023-10-22 ASSESSMENT — TONOMETRY
OD_IOP_MMHG: 12
OD_IOP_MMHG: 16
OD_IOP_MMHG: 14
OS_IOP_MMHG: 16
OS_IOP_MMHG: 12
OS_IOP_MMHG: 12
OD_IOP_MMHG: 10
OD_IOP_MMHG: 12
OS_IOP_MMHG: 14
OD_IOP_MMHG: 18
OS_IOP_MMHG: 12
OD_IOP_MMHG: 16
OD_IOP_MMHG: 13
OD_IOP_MMHG: 10
OS_IOP_MMHG: 16
OS_IOP_MMHG: 12
OS_IOP_MMHG: 16
OS_IOP_MMHG: 13

## 2023-10-22 ASSESSMENT — KERATOMETRY
OD_AXISANGLE2_DEGREES: 90
OD_K1POWER_DIOPTERS: 41.75
OS_K2POWER_DIOPTERS: 42.00
OD_K2POWER_DIOPTERS: 41.75
OS_K1POWER_DIOPTERS: 41.50

## 2023-11-16 ENCOUNTER — OFFICE VISIT (OUTPATIENT)
Age: 63
End: 2023-11-16
Payer: COMMERCIAL

## 2023-11-16 VITALS
HEIGHT: 67 IN | DIASTOLIC BLOOD PRESSURE: 70 MMHG | HEART RATE: 102 BPM | WEIGHT: 140 LBS | BODY MASS INDEX: 21.97 KG/M2 | SYSTOLIC BLOOD PRESSURE: 122 MMHG | OXYGEN SATURATION: 98 %

## 2023-11-16 DIAGNOSIS — G43.909 EPISODIC MIGRAINE: Primary | ICD-10-CM

## 2023-11-16 DIAGNOSIS — Z82.49 FAMILY HISTORY OF CEREBRAL ANEURYSM: ICD-10-CM

## 2023-11-16 PROCEDURE — 1123F ACP DISCUSS/DSCN MKR DOCD: CPT | Performed by: PSYCHIATRY & NEUROLOGY

## 2023-11-16 PROCEDURE — 99214 OFFICE O/P EST MOD 30 MIN: CPT | Performed by: PSYCHIATRY & NEUROLOGY

## 2023-11-16 RX ORDER — ESTRADIOL 1 MG/1
TABLET ORAL
COMMUNITY
End: 2023-11-16

## 2023-11-16 RX ORDER — SOY ISOFLAVONE 40 MG
TABLET ORAL DAILY
COMMUNITY

## 2023-11-16 RX ORDER — ESTRADIOL 2 MG/1
SYSTEM VAGINAL
COMMUNITY
Start: 2023-09-19

## 2023-11-21 ENCOUNTER — TELEPHONE (OUTPATIENT)
Age: 63
End: 2023-11-21

## 2023-11-21 DIAGNOSIS — F40.240 CLAUSTROPHOBIA: Primary | ICD-10-CM

## 2023-11-21 RX ORDER — LORAZEPAM 0.5 MG/1
TABLET ORAL
Qty: 2 TABLET | Refills: 0 | Status: CANCELLED | OUTPATIENT
Start: 2023-11-21 | End: 2023-12-07

## 2023-11-22 DIAGNOSIS — F40.240 CLAUSTROPHOBIA: Primary | ICD-10-CM

## 2023-11-22 RX ORDER — LORAZEPAM 0.5 MG/1
TABLET ORAL
Qty: 2 TABLET | Refills: 0 | Status: SHIPPED | OUTPATIENT
Start: 2023-11-22 | End: 2023-12-07

## 2023-12-11 ENCOUNTER — TELEPHONE (OUTPATIENT)
Age: 63
End: 2023-12-11
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
## 2024-01-04 ENCOUNTER — TELEPHONE (OUTPATIENT)
Age: 64
End: 2024-01-04

## 2024-01-04 NOTE — TELEPHONE ENCOUNTER
Faxed over MRA order to [facility] at [phone]. Called patient to inform her of this and to please have them fax us the written results to our office when completed. Given fax number.

## 2024-01-04 NOTE — TELEPHONE ENCOUNTER
Patient called to state she needs an order for an MRA faxed to [facility] at [phone].

## 2024-02-09 DIAGNOSIS — F40.240 CLAUSTROPHOBIA: Primary | ICD-10-CM
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
## 2024-02-12 RX ORDER — LORAZEPAM 0.5 MG/1
TABLET ORAL
Qty: 2 TABLET | Refills: 0 | Status: SHIPPED | OUTPATIENT
Start: 2024-02-12 | End: 2024-02-13

## 2024-03-01 DIAGNOSIS — I67.1 CEREBRAL ANEURYSM: ICD-10-CM

## 2024-03-01 DIAGNOSIS — Z82.49 FAMILY HISTORY OF CEREBRAL ANEURYSM: Primary | ICD-10-CM

## 2024-03-04 ENCOUNTER — TELEPHONE (OUTPATIENT)
Age: 64
End: 2024-03-04

## 2024-03-04 NOTE — TELEPHONE ENCOUNTER
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 CTA ordered. Please let her know. DEANGELO

## 2024-03-04 NOTE — TELEPHONE ENCOUNTER
Called the Pt. However had to leave a voice mail with the following information. Per Dr. 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 CTA ordered. Please let her know.     Gave the phone number to scheduling to call.

## 2024-03-05 ENCOUNTER — TELEPHONE (OUTPATIENT)
Age: 64
End: 2024-03-05

## 2024-03-05 DIAGNOSIS — G43.909 EPISODIC MIGRAINE: ICD-10-CM

## 2024-03-05 DIAGNOSIS — G43.909 EPISODIC MIGRAINE: Primary | ICD-10-CM

## 2024-03-05 NOTE — TELEPHONE ENCOUNTER
Patient is calling to ask if she can have her CT scan order sent to [facility].    Patient states it is the same place she had her MRA order sent to.    Patient also would like to know if she can be prescribed stronger medication to help her remain calm as she is claustrophobic. Patient states the medicine last time did not help.    Please call back and advise.

## 2024-03-05 NOTE — TELEPHONE ENCOUNTER
Received fax from [employer] stating, \"due to patient's age and contrast indication, we do need labs no older than 30 days.\"

## 2024-03-05 NOTE — TELEPHONE ENCOUNTER
Faxed over CTA head to [facility] at [phone]. Will forward other message of this telephone encounter to doctor to advise.

## 2024-03-07 NOTE — TELEPHONE ENCOUNTER
Patient requesting lab work results. Patient would also like lab results sent to [facility]    Fax # [phone]

## 2024-03-07 NOTE — TELEPHONE ENCOUNTER
Called patient. Informed her that we have not received the results yet. She got it done yesterday. Once we receive the results, I will call the patient and notify her.

## 2024-03-08 ENCOUNTER — TELEPHONE (OUTPATIENT)
Age: 64
End: 2024-03-08

## 2024-03-08 DIAGNOSIS — F40.240 CLAUSTROPHOBIA: Primary | ICD-10-CM

## 2024-03-08 LAB
BUN SERPL-MCNC: 14 MG/DL (ref 8–27)
BUN/CREAT SERPL: 16 (ref 12–28)
CALCIUM SERPL-MCNC: 9.3 MG/DL (ref 8.7–10.3)
CHLORIDE SERPL-SCNC: 104 MMOL/L (ref 96–106)
CO2 SERPL-SCNC: 21 MMOL/L (ref 20–29)
CREAT SERPL-MCNC: 0.85 MG/DL (ref 0.57–1)
EGFRCR SERPLBLD CKD-EPI 2021: 76 ML/MIN/1.73
GLUCOSE SERPL-MCNC: 84 MG/DL (ref 70–99)
POTASSIUM SERPL-SCNC: 4.8 MMOL/L (ref 3.5–5.2)
SODIUM SERPL-SCNC: 142 MMOL/L (ref 134–144)

## 2024-03-08 RX ORDER — LORAZEPAM 1 MG/1
TABLET ORAL
Qty: 1 TABLET | Refills: 0 | Status: SHIPPED | OUTPATIENT
Start: 2024-03-08 | End: 2024-03-15

## 2024-03-08 NOTE — TELEPHONE ENCOUNTER
Called patient. Informed her that the doctor reviewed her labs stating, \"Renal function is normal. Please fax to imaging center.\"    Will fax over imaging referral and labs to [facility].

## 2024-06-25 ENCOUNTER — ESTABLISHED COMPREHENSIVE EXAM (OUTPATIENT)
Dept: URBAN - METROPOLITAN AREA CLINIC 42 | Facility: CLINIC | Age: 64
End: 2024-06-25

## 2024-06-25 DIAGNOSIS — H43.393: ICD-10-CM

## 2024-06-25 DIAGNOSIS — Z96.1: ICD-10-CM

## 2024-06-25 PROCEDURE — 92014 COMPRE OPH EXAM EST PT 1/>: CPT

## 2024-06-25 ASSESSMENT — TONOMETRY
OS_IOP_MMHG: 13
OD_IOP_MMHG: 13

## 2024-06-25 ASSESSMENT — VISUAL ACUITY
OS_SC: 20/20
OD_SC: 20/20

## 2024-07-17 DIAGNOSIS — I67.1 CEREBRAL ANEURYSM: Primary | ICD-10-CM

## 2024-07-29 ENCOUNTER — TRANSCRIBE ORDERS (OUTPATIENT)
Facility: HOSPITAL | Age: 64
End: 2024-07-29

## 2024-07-29 DIAGNOSIS — Z12.31 VISIT FOR SCREENING MAMMOGRAM: Primary | ICD-10-CM

## 2024-08-01 ENCOUNTER — HOSPITAL ENCOUNTER (OUTPATIENT)
Age: 64
Discharge: HOME | End: 2024-08-01
Payer: COMMERCIAL

## 2024-08-01 VITALS — WEIGHT: 137 LBS | HEIGHT: 67 IN | BODY MASS INDEX: 21.5 KG/M2

## 2024-08-01 DIAGNOSIS — Z12.31 VISIT FOR SCREENING MAMMOGRAM: ICD-10-CM

## 2024-08-01 PROCEDURE — 77063 BREAST TOMOSYNTHESIS BI: CPT

## 2024-08-05 ENCOUNTER — TELEPHONE (OUTPATIENT)
Age: 64
End: 2024-08-05

## 2024-08-12 ENCOUNTER — TELEPHONE (OUTPATIENT)
Age: 64
End: 2024-08-12

## 2024-08-12 NOTE — TELEPHONE ENCOUNTER
FYI:     Patient left voicemail stating she wanted to cancel her appointment on 8/13/2024.   No reason was given. Patient states that she will call back to reschedule.     I will check back with patient in 2 weeks to see if she is ready to reschedule.

## 2025-04-07 ENCOUNTER — HOSPITAL ENCOUNTER (EMERGENCY)
Facility: HOSPITAL | Age: 65
Discharge: HOME | End: 2025-04-08
Attending: EMERGENCY MEDICINE
Payer: COMMERCIAL

## 2025-04-07 ENCOUNTER — APPOINTMENT (OUTPATIENT)
Facility: HOSPITAL | Age: 65
End: 2025-04-07
Payer: COMMERCIAL

## 2025-04-07 VITALS
DIASTOLIC BLOOD PRESSURE: 101 MMHG | HEART RATE: 108 BPM | TEMPERATURE: 98.2 F | SYSTOLIC BLOOD PRESSURE: 142 MMHG | RESPIRATION RATE: 15 BRPM | OXYGEN SATURATION: 100 %

## 2025-04-07 DIAGNOSIS — E87.6 HYPOKALEMIA: ICD-10-CM

## 2025-04-07 DIAGNOSIS — R07.9 ACUTE CHEST PAIN: Primary | ICD-10-CM

## 2025-04-07 DIAGNOSIS — R10.13 ABDOMINAL PAIN, EPIGASTRIC: ICD-10-CM

## 2025-04-07 LAB
ALBUMIN SERPL-MCNC: 3.5 G/DL (ref 3.5–5)
ALBUMIN/GLOB SERPL: 0.9 (ref 1.1–2.2)
ALP SERPL-CCNC: 58 U/L (ref 45–117)
ALT SERPL-CCNC: 16 U/L (ref 12–78)
ANION GAP SERPL CALC-SCNC: 2 MMOL/L (ref 2–12)
APPEARANCE UR: ABNORMAL
AST SERPL-CCNC: 13 U/L (ref 15–37)
BACTERIA URNS QL MICRO: NEGATIVE /HPF
BASOPHILS # BLD: 0.06 K/UL (ref 0–0.1)
BASOPHILS NFR BLD: 0.6 % (ref 0–1)
BILIRUB SERPL-MCNC: 0.3 MG/DL (ref 0.2–1)
BILIRUB UR QL: NEGATIVE
BUN SERPL-MCNC: 10 MG/DL (ref 6–20)
BUN/CREAT SERPL: 10 (ref 12–20)
CALCIUM SERPL-MCNC: 9.9 MG/DL (ref 8.5–10.1)
CHLORIDE SERPL-SCNC: 106 MMOL/L (ref 97–108)
CO2 SERPL-SCNC: 30 MMOL/L (ref 21–32)
COLOR UR: ABNORMAL
CREAT SERPL-MCNC: 0.99 MG/DL (ref 0.55–1.02)
DIFFERENTIAL METHOD BLD: ABNORMAL
EKG ATRIAL RATE: 107 BPM
EKG DIAGNOSIS: NORMAL
EKG P AXIS: 36 DEGREES
EKG P-R INTERVAL: 162 MS
EKG Q-T INTERVAL: 346 MS
EKG QRS DURATION: 82 MS
EKG QTC CALCULATION (BAZETT): 459 MS
EKG R AXIS: 27 DEGREES
EKG T AXIS: 35 DEGREES
EKG VENTRICULAR RATE: 106 BPM
EOSINOPHIL # BLD: 0.08 K/UL (ref 0–0.4)
EOSINOPHIL NFR BLD: 0.8 % (ref 0–7)
EPITH CASTS URNS QL MICRO: ABNORMAL /LPF
ERYTHROCYTE [DISTWIDTH] IN BLOOD BY AUTOMATED COUNT: 11.9 % (ref 11.5–14.5)
GLOBULIN SER CALC-MCNC: 3.8 G/DL (ref 2–4)
GLUCOSE SERPL-MCNC: 106 MG/DL (ref 65–100)
GLUCOSE UR STRIP.AUTO-MCNC: NEGATIVE MG/DL
HCT VFR BLD AUTO: 39.9 % (ref 35–47)
HGB BLD-MCNC: 13.4 G/DL (ref 11.5–16)
HGB UR QL STRIP: NEGATIVE
HYALINE CASTS URNS QL MICRO: ABNORMAL /LPF (ref 0–2)
IMM GRANULOCYTES # BLD AUTO: 0.03 K/UL (ref 0–0.04)
IMM GRANULOCYTES NFR BLD AUTO: 0.3 % (ref 0–0.5)
KETONES UR QL STRIP.AUTO: NEGATIVE MG/DL
LEUKOCYTE ESTERASE UR QL STRIP.AUTO: NEGATIVE
LIPASE SERPL-CCNC: 24 U/L (ref 13–75)
LYMPHOCYTES # BLD: 2.05 K/UL (ref 0.8–3.5)
LYMPHOCYTES NFR BLD: 20.9 % (ref 12–49)
MAGNESIUM SERPL-MCNC: 2.2 MG/DL (ref 1.6–2.4)
MCH RBC QN AUTO: 35.3 PG (ref 26–34)
MCHC RBC AUTO-ENTMCNC: 33.6 G/DL (ref 30–36.5)
MCV RBC AUTO: 105 FL (ref 80–99)
MONOCYTES # BLD: 0.88 K/UL (ref 0–1)
MONOCYTES NFR BLD: 9 % (ref 5–13)
NEUTS SEG # BLD: 6.72 K/UL (ref 1.8–8)
NEUTS SEG NFR BLD: 68.4 % (ref 32–75)
NITRITE UR QL STRIP.AUTO: NEGATIVE
NRBC # BLD: 0 K/UL (ref 0–0.01)
NRBC BLD-RTO: 0 PER 100 WBC
PH UR STRIP: 8 (ref 5–8)
PLATELET # BLD AUTO: 287 K/UL (ref 150–400)
PMV BLD AUTO: 8.7 FL (ref 8.9–12.9)
POTASSIUM SERPL-SCNC: 3.3 MMOL/L (ref 3.5–5.1)
PROT SERPL-MCNC: 7.3 G/DL (ref 6.4–8.2)
PROT UR STRIP-MCNC: NEGATIVE MG/DL
RBC # BLD AUTO: 3.8 M/UL (ref 3.8–5.2)
RBC #/AREA URNS HPF: ABNORMAL /HPF (ref 0–5)
SODIUM SERPL-SCNC: 138 MMOL/L (ref 136–145)
SP GR UR REFRACTOMETRY: 1.01
TROPONIN I SERPL HS-MCNC: 4 NG/L (ref 0–51)
URINE CULTURE IF INDICATED: ABNORMAL
UROBILINOGEN UR QL STRIP.AUTO: 0.2 EU/DL (ref 0.2–1)
WBC # BLD AUTO: 9.8 K/UL (ref 3.6–11)
WBC URNS QL MICRO: ABNORMAL /HPF (ref 0–4)

## 2025-04-07 PROCEDURE — 81001 URINALYSIS AUTO W/SCOPE: CPT

## 2025-04-07 PROCEDURE — 6360000002 HC RX W HCPCS: Performed by: EMERGENCY MEDICINE

## 2025-04-07 PROCEDURE — 85025 COMPLETE CBC W/AUTO DIFF WBC: CPT

## 2025-04-07 PROCEDURE — 80053 COMPREHEN METABOLIC PANEL: CPT

## 2025-04-07 PROCEDURE — 83735 ASSAY OF MAGNESIUM: CPT

## 2025-04-07 PROCEDURE — 74177 CT ABD & PELVIS W/CONTRAST: CPT

## 2025-04-07 PROCEDURE — 93005 ELECTROCARDIOGRAM TRACING: CPT | Performed by: EMERGENCY MEDICINE

## 2025-04-07 PROCEDURE — 6370000000 HC RX 637 (ALT 250 FOR IP): Performed by: EMERGENCY MEDICINE

## 2025-04-07 PROCEDURE — 96374 THER/PROPH/DIAG INJ IV PUSH: CPT

## 2025-04-07 PROCEDURE — 70450 CT HEAD/BRAIN W/O DYE: CPT

## 2025-04-07 PROCEDURE — 99285 EMERGENCY DEPT VISIT HI MDM: CPT

## 2025-04-07 PROCEDURE — 6360000004 HC RX CONTRAST MEDICATION: Performed by: EMERGENCY MEDICINE

## 2025-04-07 PROCEDURE — 83690 ASSAY OF LIPASE: CPT

## 2025-04-07 PROCEDURE — 36415 COLL VENOUS BLD VENIPUNCTURE: CPT

## 2025-04-07 PROCEDURE — 84484 ASSAY OF TROPONIN QUANT: CPT

## 2025-04-07 RX ORDER — FAMOTIDINE 20 MG/1
20 TABLET, FILM COATED ORAL 2 TIMES DAILY
Qty: 60 TABLET | Refills: 0 | Status: SHIPPED | OUTPATIENT
Start: 2025-04-07

## 2025-04-07 RX ORDER — IOPAMIDOL 755 MG/ML
100 INJECTION, SOLUTION INTRAVASCULAR
Status: COMPLETED | OUTPATIENT
Start: 2025-04-07 | End: 2025-04-07

## 2025-04-07 RX ORDER — ACETAMINOPHEN 325 MG/1
650 TABLET ORAL
Status: COMPLETED | OUTPATIENT
Start: 2025-04-07 | End: 2025-04-07

## 2025-04-07 RX ORDER — LORAZEPAM 2 MG/ML
0.5 INJECTION INTRAMUSCULAR ONCE
Status: COMPLETED | OUTPATIENT
Start: 2025-04-07 | End: 2025-04-07

## 2025-04-07 RX ADMIN — LIDOCAINE HYDROCHLORIDE 40 ML: 20 SOLUTION ORAL at 23:29

## 2025-04-07 RX ADMIN — ACETAMINOPHEN 650 MG: 325 TABLET ORAL at 21:24

## 2025-04-07 RX ADMIN — LORAZEPAM 0.5 MG: 2 INJECTION INTRAMUSCULAR; INTRAVENOUS at 22:26

## 2025-04-07 RX ADMIN — IOPAMIDOL 100 ML: 755 INJECTION, SOLUTION INTRAVENOUS at 22:54

## 2025-04-07 RX ADMIN — POTASSIUM BICARBONATE 20 MEQ: 782 TABLET, EFFERVESCENT ORAL at 23:29

## 2025-04-07 ASSESSMENT — PAIN DESCRIPTION - LOCATION: LOCATION: HEAD

## 2025-04-07 ASSESSMENT — PAIN SCALES - GENERAL
PAINLEVEL_OUTOF10: 3
PAINLEVEL_OUTOF10: 0

## 2025-04-07 ASSESSMENT — PAIN DESCRIPTION - DESCRIPTORS: DESCRIPTORS: ACHING

## 2025-04-07 ASSESSMENT — HEART SCORE: ECG: NON-SPECIFC REPOLARIZATION DISTURBANCE/LBTB/PM

## 2025-04-08 DIAGNOSIS — I72.0 CAROTID ANEURYSM, LEFT: Primary | ICD-10-CM

## 2025-04-08 NOTE — ED PROVIDER NOTES
Record.   Amarjit Chang MD (electronically signed)    (Please note that parts of this dictation were completed with voice recognition software. Quite often unanticipated grammatical, syntax, homophones, and other interpretive errors are inadvertently transcribed by the computer software. Please disregards these errors. Please excuse any errors that have escaped final proofreading.)        Amarjit Chang MD  04/07/25 0914

## 2025-04-09 ENCOUNTER — TELEPHONE (OUTPATIENT)
Age: 65
End: 2025-04-09

## 2025-04-16 ENCOUNTER — TELEPHONE (OUTPATIENT)
Age: 65
End: 2025-04-16

## 2025-04-16 NOTE — TELEPHONE ENCOUNTER
I reached out to the pt and I informed her that our nurse had looked at her referral and wanted me to inform her that we need some new imaging to be done before we can schedule her to see one of our doctors here in the office and she informed me that her referring doctor had told her that she did not need anymore imaging, so the pt informed me that she was going in for an appt tomorrow (4/17/2025) to discuss  more with her doctor and she asked for us to call her on 4/18/2025 to touch base with her to find out what her doctor said. 4/16/2025.

## 2025-05-02 ENCOUNTER — TRANSCRIBE ORDERS (OUTPATIENT)
Facility: HOSPITAL | Age: 65
End: 2025-05-02

## 2025-05-02 DIAGNOSIS — R94.31 NONSPECIFIC ABNORMAL ELECTROCARDIOGRAM (ECG) (EKG): ICD-10-CM

## 2025-05-02 DIAGNOSIS — R07.89 OTHER CHEST PAIN: Primary | ICD-10-CM

## 2025-06-04 ENCOUNTER — TRANSCRIBE ORDERS (OUTPATIENT)
Facility: HOSPITAL | Age: 65
End: 2025-06-04

## 2025-06-04 DIAGNOSIS — Z12.31 ENCOUNTER FOR SCREENING MAMMOGRAM FOR MALIGNANT NEOPLASM OF BREAST: Primary | ICD-10-CM

## 2025-06-05 ENCOUNTER — TELEPHONE (OUTPATIENT)
Age: 65
End: 2025-06-05

## 2025-06-05 NOTE — TELEPHONE ENCOUNTER
I reached out to the pt again and I reminded her that we were still waiting on imaging from her referring doctor and she informed me that at this time she was focusing on her upcoming elective surgery and that we can go a head and close out her referral to our office and if the need arose in the future and she needs to see our doctors here, she will ask her doctor to send another referral and also order that imaging that our office requires for her to have an appointment set up.6/5/2025.

## 2025-06-09 ENCOUNTER — TRANSCRIBE ORDERS (OUTPATIENT)
Facility: HOSPITAL | Age: 65
End: 2025-06-09

## 2025-06-09 DIAGNOSIS — R07.9 CHEST PAIN, UNSPECIFIED TYPE: Primary | ICD-10-CM

## 2025-06-09 DIAGNOSIS — R94.31 ABNORMAL EKG: ICD-10-CM

## 2025-08-04 ENCOUNTER — HOSPITAL ENCOUNTER (OUTPATIENT)
Age: 65
Discharge: HOME | End: 2025-08-07
Payer: COMMERCIAL

## 2025-08-04 VITALS — WEIGHT: 140 LBS | HEIGHT: 67 IN | BODY MASS INDEX: 21.97 KG/M2

## 2025-08-04 DIAGNOSIS — Z12.31 ENCOUNTER FOR SCREENING MAMMOGRAM FOR MALIGNANT NEOPLASM OF BREAST: ICD-10-CM

## 2025-08-04 PROCEDURE — 77063 BREAST TOMOSYNTHESIS BI: CPT

## 2025-08-05 ENCOUNTER — HOSPITAL ENCOUNTER (OUTPATIENT)
Facility: HOSPITAL | Age: 65
Discharge: HOME | End: 2025-08-07
Attending: STUDENT IN AN ORGANIZED HEALTH CARE EDUCATION/TRAINING PROGRAM
Payer: COMMERCIAL

## 2025-08-05 VITALS — BODY MASS INDEX: 21.97 KG/M2 | HEIGHT: 67 IN | WEIGHT: 140 LBS

## 2025-08-05 DIAGNOSIS — R94.31 ABNORMAL EKG: ICD-10-CM

## 2025-08-05 DIAGNOSIS — R07.9 CHEST PAIN, UNSPECIFIED TYPE: ICD-10-CM

## 2025-08-05 LAB
ECHO AO ASC DIAM: 3.7 CM
ECHO AO ASCENDING AORTA INDEX: 2.13 CM/M2
ECHO BSA: 1.73 M2
ECHO LV EF PHYSICIAN: 60 %
ECHO TV REGURGITANT MAX VELOCITY: 2.29 M/S
ECHO TV REGURGITANT PEAK GRADIENT: 21 MMHG
STRESS BASELINE DIAS BP: 91 MMHG
STRESS BASELINE HR: 82 BPM
STRESS BASELINE SYS BP: 130 MMHG
STRESS ESTIMATED WORKLOAD: 10.1 METS
STRESS PEAK DIAS BP: 100 MMHG
STRESS PEAK SYS BP: 140 MMHG
STRESS PERCENT HR ACHIEVED: 99 %
STRESS POST PEAK HR: 153 BPM
STRESS RATE PRESSURE PRODUCT: NORMAL BPM*MMHG
STRESS TARGET HR: 154 BPM

## 2025-08-05 PROCEDURE — 93350 STRESS TTE ONLY: CPT

## 2025-08-05 RX ORDER — NITROFURANTOIN MACROCRYSTALS 100 MG/1
100 CAPSULE ORAL 2 TIMES DAILY WITH MEALS
COMMUNITY

## 2025-08-05 RX ORDER — HYDROCHLOROTHIAZIDE 25 MG/1
25 TABLET ORAL PRN
COMMUNITY

## 2025-08-05 RX ORDER — TESTOSTERONE GEL, 1% 10 MG/G
5 GEL TRANSDERMAL DAILY
COMMUNITY